# Patient Record
Sex: FEMALE | Race: WHITE
[De-identification: names, ages, dates, MRNs, and addresses within clinical notes are randomized per-mention and may not be internally consistent; named-entity substitution may affect disease eponyms.]

---

## 2023-10-23 ENCOUNTER — TELEPHONE (OUTPATIENT)
Facility: HOSPITAL | Age: 23
End: 2023-10-23

## 2023-11-03 ENCOUNTER — HOSPITAL ENCOUNTER (OUTPATIENT)
Facility: HOSPITAL | Age: 23
Setting detail: RECURRING SERIES
Discharge: HOME | End: 2023-11-06
Payer: COMMERCIAL

## 2023-11-03 PROCEDURE — 97162 PT EVAL MOD COMPLEX 30 MIN: CPT

## 2023-11-03 PROCEDURE — 97110 THERAPEUTIC EXERCISES: CPT

## 2023-11-03 NOTE — PROGRESS NOTES
[facility]  [address]  NO:[phone]  Plan of Care / Statement of Necessity for Physical Therapy Services     Patient Name: Vaughn Hart : 2000   Medical   Diagnosis: Pain in right knee [M25.561] Treatment Diagnosis: M25.561  RIGHT KNEE PAIN     Onset Date: 23( Date of surgery )      Referral Source: Hortensia Baca MD Start of Care [facility]): 11/3/2023   Prior Hospitalization: See medical history Provider #: [id number]   Prior Level of Function: Independent with ADLS and community activities with Less Pain    Comorbidities: Anxiety or Panic disorder , depression ,other disorders      Assessment / key information:  Vaughn Hart is a 21 y.o.  yo female with Dx: of R knee Pain S/P Right knee Lawson procedure , who reports R knee Pain . Pt rates pain as 6/10 max, 0/10 at best, 4/10 today, increasing with  ADLS, walking ,stair climbing , community activities . Stair climbing . Pain increases with R knee Ext only Objective: FOTO score = 38 (an established functional score where 100 = no disability). PT unable to assess ROM of R knee due to inappropriate clothing of patient . Pt also reports that she has R knee Brace  under her clothing . MMT:  R knee Flex  3-/5 , R knee ext 2+/5 , R hip Flex 3/5 . Pain and Tenderness  on R knee on Palpation over clothing . Pt instructed in HEP and will f/u in clinic for PT. Evaluation Complexity:  History:  MEDIUM  Complexity : 1-2 comorbidities / personal factors will impact the outcome/ POC ; Examination:  MEDIUM Complexity : 3 Standardized tests and measures addressin body structure, function, activity limitation and / or participation in recreation  ;Presentation:  MEDIUM Complexity : Evolving with changing characteristics  ; Clinical Decision Making:  MEDIUM Complexity : FOTO score of 26-74 FOTO score = an established functional score

## 2023-11-03 NOTE — PROGRESS NOTES
PHYSICAL / OCCUPATIONAL THERAPY - DAILY TREATMENT NOTE (updated )  For Eval visit    Patient Name: Babita Mention    Date: 11/3/2023    : 2000  Insurance: Payor: Vish Grant / Plan: Bob Luciano / Product Type: *No Product type* /      Patient  verified yes     Visit #   Current / Total 1 16   Time   In / Out 145 230   Pain   In / Out 4 4   Subjective Functional Status/Changes: See POC     TREATMENT AREA =  see POC    OBJECTIVE          22 min   Eval - untimed                      Therapeutic Procedures: Tx Min Billable or 1:1 Min (if diff from Tx Min) Procedure, Rationale, Specifics   23  42946 Therapeutic Exercise (timed):  increase ROM, strength, coordination, balance, and proprioception to improve patient's ability to progress to PLOF and address remaining functional goals.  (see flow sheet as applicable)     Details if applicable:              Details if applicable:            Details if applicable:            Details if applicable:            Details if applicable:     21  [facility] Totals Reminder: bill using total billable min of TIMED therapeutic procedures (example: do not include dry needle or estim unattended, both untimed codes, in totals to left)  8-22 min = 1 unit; 23-37 min = 2 units; 38-52 min = 3 units; 53-67 min = 4 units; 68-82 min = 5 units   Total Total     [x]  Patient Education billed concurrently with other procedures   [x] Review HEP    [] Progressed/Changed HEP, detail:    [] Other detail:       Objective Information/Functional Measures/Assessment    See POC    Patient will continue to benefit from skilled PT / OT services to modify and progress therapeutic interventions, analyze and address functional mobility deficits, analyze and address ROM deficits, analyze and address strength deficits, analyze and address soft tissue restrictions, analyze and cue for proper movement patterns, and analyze and modify for postural abnormalities to address functional deficits and

## 2023-11-10 ENCOUNTER — HOSPITAL ENCOUNTER (OUTPATIENT)
Facility: HOSPITAL | Age: 23
Setting detail: RECURRING SERIES
End: 2023-11-10
Payer: COMMERCIAL

## 2023-11-17 ENCOUNTER — HOSPITAL ENCOUNTER (OUTPATIENT)
Facility: HOSPITAL | Age: 23
Setting detail: RECURRING SERIES
Discharge: HOME | End: 2023-11-20
Payer: COMMERCIAL

## 2023-11-17 PROCEDURE — 97530 THERAPEUTIC ACTIVITIES: CPT

## 2023-11-17 PROCEDURE — 97110 THERAPEUTIC EXERCISES: CPT

## 2023-11-17 PROCEDURE — 97140 MANUAL THERAPY 1/> REGIONS: CPT

## 2023-11-17 NOTE — PROGRESS NOTES
PHYSICAL / OCCUPATIONAL THERAPY - DAILY TREATMENT NOTE (updated )    Patient Name: Rohini Martin    Date: 2023    : 2000  Insurance: Payor: Olamide Mann / Plan: [place] / Product Type: *No Product type* /      Patient  verified Yes     Visit #   Current / Total 2 16   Time   In / Out 10:12 11:08   Pain   In / Out 0 0   Subjective Functional Status/Changes: \"I have a lot pain when I kick my right leg out. \"     TREATMENT AREA =  Pain in right knee [M25.561]    OBJECTIVE    Modalities Rationale:     increase tissue extensibility to improve patient's ability to progress to PLOF and address remaining functional goals. min [] Estim Unattended, type/location:                                      []  w/ice    []  w/heat    min [] Estim Attended, type/location:                                     []  w/US     []  w/ice    []  w/heat    []  TENS insruct      min []  Mechanical Traction: type/lbs                   []  pro   []  sup   []  int   []  cont    []  before manual    []  after manual    min []  Ultrasound, settings/location:     10 min  unbill []  Ice     [x]  Heat    location/position: Supine   right knee    min []  Paraffin,  details:     min []  Vasopneumatic Device, press/temp:     min []  Aman Galo / Eliza Ropsanjay: If using vaso (only need to measure limb vaso being performed on)      pre-treatment girth :       post-treatment girth :       measured at (landmark location) :      min []  Other:    Skin assessment post-treatment:   Intact      Therapeutic Procedures: Tx Min Billable or 1:1 Min (if diff from Tx Min) Procedure, Rationale, Specifics   17  68080 Therapeutic Exercise (timed):  increase ROM, strength, coordination, balance, and proprioception to improve patient's ability to progress to PLOF and address remaining functional goals.  (see flow sheet as applicable)     Details if applicable:       11  12806 Therapeutic Activity (timed):  use of dynamic activities

## 2023-11-22 ENCOUNTER — HOSPITAL ENCOUNTER (OUTPATIENT)
Facility: HOSPITAL | Age: 23
Setting detail: RECURRING SERIES
Discharge: HOME | End: 2023-11-25
Payer: COMMERCIAL

## 2023-11-22 PROCEDURE — G0283 ELEC STIM OTHER THAN WOUND: HCPCS

## 2023-11-22 PROCEDURE — 97535 SELF CARE MNGMENT TRAINING: CPT

## 2023-11-22 PROCEDURE — 97530 THERAPEUTIC ACTIVITIES: CPT

## 2023-11-22 PROCEDURE — 97110 THERAPEUTIC EXERCISES: CPT

## 2023-11-22 NOTE — PROGRESS NOTES
PHYSICAL / OCCUPATIONAL THERAPY - DAILY TREATMENT NOTE (updated )    Patient Name: Radha Anthony    Date: 2023    : 2000  Insurance: Payor: Aleida Gilmoreor / Plan: Grisel Mahoney / Product Type: *No Product type* /      Patient  verified Yes     Visit #   Current / Total 3 16   Time   In / Out 850 1001   Pain   In / Out 1 1   Subjective Functional Status/Changes: It is still tight and I can't make a revolution on the bike. It still feels like it wants to give out on me every once in awhile. TREATMENT AREA =  Pain in right knee [M25.561]    OBJECTIVE    Modalities Rationale:     decrease pain, increase tissue extensibility, and increase muscle contraction/control to improve patient's ability to progress to PLOF and address remaining functional goals. 15 min [x] Estim Unattended, type/location:NMES right quad/VMO with quad sets   10/20                                      []  w/ice    []  w/heat    min [] Estim Attended, type/location:                                     []  w/US     []  w/ice    []  w/heat    []  TENS insruct      min []  Mechanical Traction: type/lbs                   []  pro   []  sup   []  int   []  cont    []  before manual    []  after manual    min []  Ultrasound, settings/location:     10 min  unbill []  Ice     [x]  Heat  post   location/position:right knee     min []  Paraffin,  details:     min []  Vasopneumatic Device, press/temp:     min []  Eula Salon / Delos Overcast: If using vaso (only need to measure limb vaso being performed on)      pre-treatment girth :       post-treatment girth :       measured at (landmark location) :      min []  Other:    Skin assessment post-treatment:   Redness, no adverse reactions      Therapeutic Procedures:     Tx Min Billable or 1:1 Min (if diff from Tx Min) Procedure, Rationale, Specifics   14 17 14824 Therapeutic Exercise (timed):  increase ROM, strength, coordination, balance, and proprioception to improve patient's ability to

## 2023-11-29 ENCOUNTER — TELEPHONE (OUTPATIENT)
Facility: HOSPITAL | Age: 23
End: 2023-11-29

## 2023-12-01 ENCOUNTER — HOSPITAL ENCOUNTER (OUTPATIENT)
Facility: HOSPITAL | Age: 23
Setting detail: RECURRING SERIES
Discharge: HOME | End: 2023-12-04
Payer: COMMERCIAL

## 2023-12-01 PROCEDURE — G0283 ELEC STIM OTHER THAN WOUND: HCPCS

## 2023-12-01 PROCEDURE — 97110 THERAPEUTIC EXERCISES: CPT

## 2023-12-01 PROCEDURE — 97530 THERAPEUTIC ACTIVITIES: CPT

## 2023-12-01 NOTE — PROGRESS NOTES
PHYSICAL / OCCUPATIONAL THERAPY - DAILY TREATMENT NOTE (updated )    Patient Name: Jewell Santamaria    Date: 2023    : 2000  Insurance: Payor: Bruce Hernandez / Plan: Barney Morocho / Product Type: *No Product type* /      Patient  verified Yes     Visit #   Current / Total 4 16   Time   In / Out 3:35 4:25   Pain   In / Out 0 0   Subjective Functional Status/Changes: PN completed today     TREATMENT AREA =  Pain in right knee [M25.561]    OBJECTIVE    Modalities Rationale:     decrease inflammation, decrease pain, and increase tissue extensibility to improve patient's ability to progress to PLOF and address remaining functional goals. 15 min [x] Estim Unattended, type/location:  [place] stem 15 (completed with FOTO)                                    []  w/ice    []  w/heat    min [] Estim Attended, type/location:                                     []  w/US     []  w/ice    []  w/heat    []  TENS insruct      min []  Mechanical Traction: type/lbs                   []  pro   []  sup   []  int   []  cont    []  before manual    []  after manual    min []  Ultrasound, settings/location:     10 min  unbill []  Ice     [x]  Heat    location/position: Recline at end of session    min []  Paraffin,  details:     min []  Vasopneumatic Device, press/temp:     min []  Giovannyvin Loots / Bolden Been: If using vaso (only need to measure limb vaso being performed on)      pre-treatment girth :       post-treatment girth :       measured at (landmark location) :      min []  Other:    Skin assessment post-treatment:   Intact      Therapeutic Procedures: Tx Min Billable or 1:1 Min (if diff from Tx Min) Procedure, Rationale, Specifics   10  12201 Therapeutic Activity (timed):  use of dynamic activities replicating functional movements to increase ROM, strength, coordination, balance, and proprioception in order to improve patient's ability to progress to PLOF and address remaining functional goals.   (see flow sheet

## 2023-12-01 NOTE — THERAPY EVALUATION
[address] [place] [address] PHYSICAL THERAPY  [address] [place] [address], [address] [place] [address]  AW:[phone] PE:[phone]  PHYSICAL THERAPY PROGRESS NOTE  Patient Name: Maria T Shabazz : 2000   Treatment/Medical Diagnosis: Pain in right knee [M25.561]   Referral Source: Gia Teran MD     Date of Initial Visit: 11/3/23 Attended Visits: 4 Missed Visits: 0     SUMMARY OF TREATMENT  Treatment has consisted of therapeutic exercise, therapeutic activities, neuro re-education, and use of e-stim to address R knee strength and ROM. Use of MHP at end of session. CURRENT STATUS  Pt reports improved strength and improve overall pain. Improved FOTO score from 35 to 45, demonstrating improved functional mobility. Pt achieved 97* of knee flexion this date. Pt continues to ambulate in brace locked at 120*. Pt unable to complete SLR or full ROM during LAQ. Pt reports pain with standing SLR . Pt provided updated HEP. Short Term Goals: To be accomplished in  3-4  weeks  1. Independent with HEP. EVAL: HEP established and given to patient   PN: reports compliance with HEP as able to perform  MET     2. Decrease pain to 2/10  to assist with  ADLs  and community activities   EVAL: 4/10    PN:1/10     12/1/23 MET    Long Term Goals: To be accomplished in  6-8  weeks:  1. Decrease  pain to 0/10  to assist with  ADLS  and community activities  EVAL:  4/10    PN:1/10     12/1/23 progressing    2. Improve FOTO Functional Status Score by 23  points in order to show significant functional improvement. EVAL: 38   PN: 45 progressing    3.   To improve   R knee Flex  3-/5 , R knee ext 2+/5 , R hip Flex 3/5  strength to 5/5 in order to assist with ADLS and community activities with decrease pain   EVAL:  R knee Flex  3-/5 , R knee ext 2+/5 , R hip Flex 3/5 strength   PN: R knee Flex  3-/5 , R knee ext -3/5, R hip Flex 4+/5 strength progressing       Functional Gains: Can extend her knee

## 2023-12-08 ENCOUNTER — HOSPITAL ENCOUNTER (OUTPATIENT)
Facility: HOSPITAL | Age: 23
Setting detail: RECURRING SERIES
Discharge: HOME | End: 2023-12-11
Payer: COMMERCIAL

## 2023-12-08 PROCEDURE — 97110 THERAPEUTIC EXERCISES: CPT

## 2023-12-08 PROCEDURE — G0283 ELEC STIM OTHER THAN WOUND: HCPCS

## 2023-12-08 PROCEDURE — 97112 NEUROMUSCULAR REEDUCATION: CPT

## 2023-12-08 PROCEDURE — 97530 THERAPEUTIC ACTIVITIES: CPT

## 2023-12-08 NOTE — PROGRESS NOTES
applicable)     Details if applicable:       12 12 13923 Therapeutic Activity (timed):  use of dynamic activities replicating functional movements to increase ROM, strength, coordination, balance, and proprioception in order to improve patient's ability to progress to PLOF and address remaining functional goals. (see flow sheet as applicable)     Details if applicable:     10 10 86069 Neuromuscular Re-Education (timed):  improve balance, coordination, kinesthetic sense, posture, core stability and proprioception to improve patient's ability to develop conscious control of individual muscles and awareness of position of extremities in order to progress to PLOF and address remaining functional goals. (see flow sheet as applicable)     Details if applicable:            Details if applicable:            Details if applicable:     36 41 [facility] Totals Reminder: bill using total billable min of TIMED therapeutic procedures (example: do not include dry needle or estim unattended, both untimed codes, in totals to left)  8-22 min = 1 unit; 23-37 min = 2 units; 38-52 min = 3 units; 53-67 min = 4 units; 68-82 min = 5 units   Total Total     [x]  Patient Education billed concurrently with other procedures   [x] Review HEP    [] Progressed/Changed HEP, detail:    [] Other detail:       Objective Information/Functional Measures/Assessment    VC exercises and technique  Now has home NMES and using 2x day 10 minutes  Heel slide F 120    E -5 supine mild Hamstring tightness and encouraged addition of gentle hamstring stretch to HEP    Able to demonstrate improved AROM LAQ with  mild to mod TKE lag due to weakness and some pain reports anterior infrapatellar region.          Patient will continue to benefit from skilled PT / OT services to modify and progress therapeutic interventions, analyze and address ROM deficits, analyze and address strength deficits, analyze and address soft tissue restrictions, analyze and cue for proper

## 2023-12-12 ENCOUNTER — HOSPITAL ENCOUNTER (OUTPATIENT)
Facility: HOSPITAL | Age: 23
Setting detail: RECURRING SERIES
Discharge: HOME | End: 2023-12-15
Payer: COMMERCIAL

## 2023-12-12 PROCEDURE — G0283 ELEC STIM OTHER THAN WOUND: HCPCS

## 2023-12-12 PROCEDURE — 97140 MANUAL THERAPY 1/> REGIONS: CPT

## 2023-12-12 PROCEDURE — 97110 THERAPEUTIC EXERCISES: CPT

## 2023-12-12 NOTE — PROGRESS NOTES
PHYSICAL / OCCUPATIONAL THERAPY - DAILY TREATMENT NOTE (updated )    Patient Name: Narda Krishna    Date: 2023    : 2000  Insurance: Payor: Stephanie Thorpe / Plan: [place] / Product Type: *No Product type* /      Patient  verified Yes     Visit #   Current / Total 2 8   Time   In / Out 1128 1235   Pain   In / Out 0 0   Subjective Functional Status/Changes: It's doing alright. Using the unit at home 2x day     TREATMENT AREA =  Pain in right knee [M25.561]    OBJECTIVE    Modalities Rationale:     decrease pain, increase tissue extensibility, increase muscle contraction/control, and post exercise  to improve patient's ability to progress to PLOF and address remaining functional goals. 15 min [x] Estim Unattended, type/location:Pakistani, right quad with Quad sets, 10/10                                       []  w/ice    []  w/heat    min [] Estim Attended, type/location:                                     []  w/US     []  w/ice    []  w/heat    []  TENS insruct      min []  Mechanical Traction: type/lbs                   []  pro   []  sup   []  int   []  cont    []  before manual    []  after manual    min []  Ultrasound, settings/location:     10 min  unbill []  Ice     [x]  Heat  post   location/position:supine right knee     min []  Paraffin,  details:     min []  Vasopneumatic Device, press/temp:     min []  Arleene Coats / Harlene : If using vaso (only need to measure limb vaso being performed on)      pre-treatment girth :       post-treatment girth :       measured at (landmark location) :      min []  Other:    Skin assessment post-treatment:   Intact      Therapeutic Procedures: Tx Min Billable or 1:1 Min (if diff from Tx Min) Procedure, Rationale, Specifics   12 8 73911 Therapeutic Exercise (timed):  increase ROM, strength, coordination, balance, and proprioception to improve patient's ability to progress to PLOF and address remaining functional goals.  (see flow sheet as

## 2023-12-22 ENCOUNTER — HOSPITAL ENCOUNTER (OUTPATIENT)
Facility: HOSPITAL | Age: 23
Setting detail: RECURRING SERIES
Discharge: HOME | End: 2023-12-25
Payer: COMMERCIAL

## 2023-12-22 PROCEDURE — 97110 THERAPEUTIC EXERCISES: CPT

## 2023-12-22 PROCEDURE — G0283 ELEC STIM OTHER THAN WOUND: HCPCS

## 2023-12-22 PROCEDURE — 97140 MANUAL THERAPY 1/> REGIONS: CPT

## 2023-12-22 PROCEDURE — 97530 THERAPEUTIC ACTIVITIES: CPT

## 2023-12-22 NOTE — PROGRESS NOTES
tissue restrictions, analyze and cue for proper movement patterns, analyze and modify for postural abnormalities, and instruct in home and community integration to address functional deficits and attain remaining goals. Progress toward goals / Updated goals:  []  See Progress Note/Recertification    New Goals to be achieved in __4__ weeks     1. Decrease  pain to 0/10  to assist with  ADLS and community activities  EVAL:  4/10    PN:1/10   12/1/23 progressing  CURRENT 0 12/22/23     2. Improve FOTO Functional Status Score by 23  points in order to show significant functional improvement. EVAL: 38   PN: 45 progressing  CURRENT NA 12/22/23     3. To improve   R knee Flex  3-/5 , R knee ext 2+/5 , R hip Flex 3/5  strength to 5/5 in order to assist with ADLS and community activities with decrease pain   EVAL:  R knee Flex  3-/5 , R knee ext 2+/5 , R hip Flex 3/5 strength   PN: R knee Flex  3-/5 , R knee ext -3/5, R hip Flex 4+/5 strength progressing   CURRENT NA 12/22/23    4. Pt to achieve 110* knee flexion ROM to improve tolerance with sitting in chairs. PN: 12/1/23  97* of knee flexion  CURRENT  122 F and -0 E 12/19/23        NA 12/22/23        Next PN/ RC due     PN 1/1/24  Auth due 30 v - 12/31/23         PLAN  - Continue Plan of Care  - Upgrade activities as tolerated  MD 30 day note likely will be due next session pending new appointments post today's session. Valeriano Wild, PT    12/22/2023    12:59 PM    No future appointments.

## 2024-01-02 ENCOUNTER — HOSPITAL ENCOUNTER (OUTPATIENT)
Facility: HOSPITAL | Age: 24
Setting detail: RECURRING SERIES
End: 2024-01-02
Payer: COMMERCIAL

## 2024-01-02 ENCOUNTER — TELEPHONE (OUTPATIENT)
Facility: HOSPITAL | Age: 24
End: 2024-01-02

## 2024-01-04 ENCOUNTER — HOSPITAL ENCOUNTER (OUTPATIENT)
Facility: HOSPITAL | Age: 24
Setting detail: RECURRING SERIES
Discharge: HOME | End: 2024-01-07
Payer: COMMERCIAL

## 2024-01-04 PROCEDURE — 97530 THERAPEUTIC ACTIVITIES: CPT

## 2024-01-04 PROCEDURE — G0283 ELEC STIM OTHER THAN WOUND: HCPCS

## 2024-01-04 PROCEDURE — 97110 THERAPEUTIC EXERCISES: CPT

## 2024-01-04 NOTE — PROGRESS NOTES
[facility]  [address], [address], [place] [address]  Ph:[phone] Fx:[phone]  PHYSICAL THERAPY PROGRESS NOTE  Patient Name: Sunshine Dumont : 2000   Treatment/Medical Diagnosis: Pain in right knee [M25.561]   Referral Source: Zac Aguirre MD     Date of Initial Visit: 11/3/23 Attended Visits: 10 Missed Visits: 0     SUMMARY OF TREATMENT  Patient has attended 10 visits since IE on 11/3/23. Treatment has consisted of: therapeutic exercise, therapeutic activities, neuromuscular re-education, and use of electrical stimulation and heat modalities.     CURRENT STATUS  Ms. Dumont presents for 10th visit including IE with report of 60% improvement since her right knee Lawson procedure on 23. She reports significant improvements in ambulation and standing tolerance, as well as improved ease with transferring in and out of her bathtub since SOC. While her stair negotiation has improved, she reports her greatest challenge remains with stair descent, reporting continued instability/lack of eccentric quad control. Decreased quad strength has also impeded her ability to drive her manual transmission vehicle and lift heavier household items to care for her pets. Ms. Dumont would continue to benefit from skilled PT intervention 2x/week for 4 weeks to continue to increase her quad strength and eccentric control for reduced instability and increased safety to return her to her PLOF.      Current Goal Status:   1.  Decrease  pain to 0/10  to assist with  ADLS and community activities  EVAL:  4/10    PN:10   23 progressing  CURRENT 0/10 prior to fall, 5-6/10 max post fall with end range bending     2.  Improve FOTO Functional Status Score by 23  points in order to show significant functional improvement.  EVAL: 38   PN: 45 progressing  CURRENT: progressing, 54 points      3.  To improve   R knee Flex  3-/5 , R knee ext 2+/5 , R hip 
insruct      min []  Mechanical Traction: type/lbs                   []  pro   []  sup   []  int   []  cont    []  before manual    []  after manual    min []  Ultrasound, settings/location:     PD min  unbill []  Ice     [x]  Heat   post  location/position:reclined, right knee     min []  Paraffin,  details:     min []  Vasopneumatic Device, press/temp:     min []  [place]irlpool / Fluido:    If using vaso (only need to measure limb vaso being performed on)      pre-treatment girth :       post-treatment girth :       measured at (landmark location) :      min []  Other:    Skin assessment post-treatment:   Intact      Therapeutic Procedures:    Tx Min Billable or 1:1 Min (if diff from Tx Min) Procedure, Rationale, Specifics   8  17219 Therapeutic Exercise (timed):  increase ROM, strength, coordination, balance, and proprioception to improve patient's ability to progress to PLOF and address remaining functional goals. (see flow sheet as applicable)     Details if applicable:       37  30187 Therapeutic Activity (timed):  use of dynamic activities replicating functional movements to increase ROM, strength, coordination, balance, and proprioception in order to improve patient's ability to progress to PLOF and address remaining functional goals.  (see flow sheet as applicable)     Details if applicable:  foto, functional assessments    x  91884 Manual Therapy (timed):  decrease pain, increase ROM, and increase tissue extensibility to improve patient's ability to progress to PLOF and address remaining functional goals.  The manual therapy interventions were performed at a separate and distinct time from the therapeutic activities interventions . (see flow sheet as applicable)     Details if applicable: reclined right knee scar massage, patellar mobs, STM effleurage to quad and distal hamstring tendons. ROM Knee F/E with gentle over stretch within tolerance           Details if applicable:            Details if applicable:

## 2024-01-09 ENCOUNTER — HOSPITAL ENCOUNTER (OUTPATIENT)
Facility: HOSPITAL | Age: 24
Setting detail: RECURRING SERIES
Discharge: HOME | End: 2024-01-12
Payer: COMMERCIAL

## 2024-01-09 PROCEDURE — 97112 NEUROMUSCULAR REEDUCATION: CPT

## 2024-01-09 PROCEDURE — G0283 ELEC STIM OTHER THAN WOUND: HCPCS

## 2024-01-09 PROCEDURE — 97110 THERAPEUTIC EXERCISES: CPT

## 2024-01-09 PROCEDURE — 97530 THERAPEUTIC ACTIVITIES: CPT

## 2024-01-09 NOTE — PROGRESS NOTES
Measures/Assessment    Patient unable to tolerate increased loading with terminal knee extension during 3 way hip exercise but exhibiting increased extension in stance with completion of reps on contralateral LE. Improved comfort noted post VC to increase hip hinge during functional squatting; patient able to tolerate progression to use of of foam for increased stabilization challenge immediately post form correction.     Patient will continue to benefit from skilled PT / OT services to modify and progress therapeutic interventions, analyze and address functional mobility deficits, analyze and address ROM deficits, analyze and address strength deficits, analyze and address soft tissue restrictions, analyze and cue for proper movement patterns, analyze and modify for postural abnormalities, analyze and address imbalance/dizziness, and instruct in home and community integration to address functional deficits and attain remaining goals.    Progress toward goals / Updated goals:  []  See Progress Note/Recertification    1.   Decrease  pain to 0/10  to assist with  ADLS and community activities  EVAL:  4/10    PN: progressing, 0/10 prior to fall, 5-6/10 max post fall with end range bending   Current: ongoing, presents to clinic with 0/10 pain but pain rises with right knee extension    2.  Improve FOTO Functional Status Score by 23 to 61 points in order to show significant functional improvement.  EVAL: 38   PN:  progressing, 54 points      3.  To improve   R knee Flex  3-/5 , R knee ext 2+/5 , R hip Flex 3/5  strength to 5/5 in order to assist with ADLS and community activities with decrease pain   PN: progressing, knee flex: 4/5, knee ext: 3/5, hip flex: 4+/5     4. Pt to achieve 127* knee flexion ROM to improve tolerance with sitting in chairs.   PN:  122 F and -0 E (prior to fall)       Next PN/ RC due 2/2/24  Auth due date pending    PLAN  - Continue Plan of Care    Viri Goncalves, PTA    1/9/2024    9:38

## 2024-01-12 ENCOUNTER — HOSPITAL ENCOUNTER (OUTPATIENT)
Facility: HOSPITAL | Age: 24
Setting detail: RECURRING SERIES
Discharge: HOME | End: 2024-01-15
Payer: COMMERCIAL

## 2024-01-12 PROCEDURE — 97530 THERAPEUTIC ACTIVITIES: CPT

## 2024-01-12 PROCEDURE — 97112 NEUROMUSCULAR REEDUCATION: CPT

## 2024-01-12 PROCEDURE — 97110 THERAPEUTIC EXERCISES: CPT

## 2024-01-12 PROCEDURE — G0283 ELEC STIM OTHER THAN WOUND: HCPCS

## 2024-01-12 NOTE — PROGRESS NOTES
PHYSICAL / OCCUPATIONAL THERAPY - DAILY TREATMENT NOTE    Patient Name: Sunshine Dumont    Date: 2024    : 2000  Insurance: Payor: BCBS / Plan: BCBS OUT OF STATE / Product Type: *No Product type* /      Patient  verified Yes     Visit #   Current / Total 3 8   Time   In / Out 1010 1104   Pain   In / Out 0 0   Subjective Functional Status/Changes: I did see the doctor, Keep the brace on due to decrease quad control . Today had to walk at [facility] 1 mile with no increase pain.      TREATMENT AREA =  Pain in right knee [M25.561]    OBJECTIVE   Modality rationale: increase muscle contraction/control to improve the patient’s ability to tolerate ADLs and activities   Min Type Additional Details   10 [x] Estim:  [x]Unatt       []IFC  []Premod                        [x]Other: Danish right Quad and VMO []w/ice   []w/heat  Position:  Location:    [] Estim: []Att    []TENS instruct  []NMES                    []Other:  []w/US   []w/ice   []w/heat  Position:  Location:    []  Traction: [] Cervical       []Lumbar                       [] Prone          []Supine                       []Intermittent   []Continuous Lbs:  [] before manual  [] after manual    []  Ultrasound: []Continuous   [] Pulsed                           []1MHz   []3MHz Location:  W/cm2:    []  Iontophoresis with dexamethasone         Location: [] Take home patch   [] In clinic    []  Ice     []  heat  []  Ice massage  []  Laser   []  Anodyne Position:  Location:    []  Laser with stim  []  Other: Position:  Location:    []  Vasopneumatic Device  Pre-treatment girth:  Post-treatment girth:  Measured at (location):  Pressure:       [] lo [] med [] hi   Temperature: [] lo [] med [] hi   [] Skin assessment post-treatment:  []intact []redness- no adverse reaction    []redness - adverse reaction:       Therapeutic Procedures:    Tx Min Billable or 1:1 Min (if diff from Tx Min) Procedure, Rationale, Specifics   15 11 [id number] Therapeutic Exercise

## 2024-01-16 ENCOUNTER — APPOINTMENT (OUTPATIENT)
Facility: HOSPITAL | Age: 24
End: 2024-01-16
Payer: COMMERCIAL

## 2024-01-18 ENCOUNTER — HOSPITAL ENCOUNTER (OUTPATIENT)
Facility: HOSPITAL | Age: 24
Setting detail: RECURRING SERIES
Discharge: HOME | End: 2024-01-21
Payer: COMMERCIAL

## 2024-01-18 PROCEDURE — G0283 ELEC STIM OTHER THAN WOUND: HCPCS

## 2024-01-18 PROCEDURE — 97112 NEUROMUSCULAR REEDUCATION: CPT

## 2024-01-18 PROCEDURE — 97110 THERAPEUTIC EXERCISES: CPT

## 2024-01-18 PROCEDURE — 97530 THERAPEUTIC ACTIVITIES: CPT

## 2024-01-18 NOTE — PROGRESS NOTES
PHYSICAL / OCCUPATIONAL THERAPY - DAILY TREATMENT NOTE    Patient Name: Sunshine Dumont    Date: 2024    : 2000  Insurance: Payor: BCBS / Plan: BCBS OUT OF STATE / Product Type: *No Product type* /      Patient  verified Yes     Visit #   Current / Total 4 8   Time   In / Out 1006 1110   Pain   In / Out 0 0   Subjective Functional Status/Changes: Patient reports no changes.      TREATMENT AREA =  Pain in right knee [M25.561]    OBJECTIVE   Modality rationale: increase muscle contraction/control to improve the patient’s ability to tolerate ADLs and activities   Min Type Additional Details   10 [x] Estim:  [x]Unatt       []IFC  []Premod                        [x]Other: [place] right Quad and VMO []w/ice   []w/heat  Position: long sitting  Location:right quad/VMO    [] Estim: []Att    []TENS instruct  []NMES                    []Other:  []w/US   []w/ice   []w/heat  Position:  Location:    []  Traction: [] Cervical       []Lumbar                       [] Prone          []Supine                       []Intermittent   []Continuous Lbs:  [] before manual  [] after manual    []  Ultrasound: []Continuous   [] Pulsed                           []1MHz   []3MHz Location:  W/cm2:    []  Iontophoresis with dexamethasone         Location: [] Take home patch   [] In clinic    []  Ice     []  heat  []  Ice massage  []  Laser   []  Anodyne Position:  Location:    []  Laser with stim  []  Other: Position:  Location:    []  Vasopneumatic Device  Pre-treatment girth:  Post-treatment girth:  Measured at (location):  Pressure:       [] lo [] med [] hi   Temperature: [] lo [] med [] hi   [x] Skin assessment post-treatment:  [x]intact [x]redness- no adverse reaction    []redness - adverse reaction:       Therapeutic Procedures:    Tx Min Billable or 1:1 Min (if diff from Tx Min) Procedure, Rationale, Specifics   15 15 16845 Therapeutic Exercise (timed):  increase ROM, strength, coordination, balance, and

## 2024-01-24 ENCOUNTER — HOSPITAL ENCOUNTER (OUTPATIENT)
Facility: HOSPITAL | Age: 24
Setting detail: RECURRING SERIES
Discharge: HOME | End: 2024-01-27
Payer: COMMERCIAL

## 2024-01-24 PROCEDURE — 97530 THERAPEUTIC ACTIVITIES: CPT

## 2024-01-24 PROCEDURE — 97110 THERAPEUTIC EXERCISES: CPT

## 2024-01-24 PROCEDURE — 97032 APPL MODALITY 1+ESTIM EA 15: CPT

## 2024-01-24 NOTE — PROGRESS NOTES
PHYSICAL / OCCUPATIONAL THERAPY - DAILY TREATMENT NOTE    Patient Name: Sunshine Dumont    Date: 2024    : 2000  Insurance: Payor: BCBS / Plan: BCBS OUT OF STATE / Product Type: *No Product type* /      Patient  verified Yes     Visit #   Current / Total 5 8   Time   In / Out 1253 pm  135 pm    Pain   In / Out 0/10  0/0   Subjective Functional Status/Changes: Patient denies any right knee pain today. Patient reports not wearing the brace today due it being too tight around her calf and causing increased pain.      TREATMENT AREA =  Pain in right knee [M25.561]    OBJECTIVE    Modalities Rationale:     increase muscle contraction/control to improve patient's ability to progress to PLOF and address remaining functional goals.     min [] Estim Unattended, type/location:                                      []  w/ice    []  w/heat   10 min [] Estim Attended, type/location:  NMES to right quad                                   []  w/US     []  w/ice    []  w/heat    []  TENS insruct      min []  Mechanical Traction: type/lbs                   []  pro   []  sup   []  int   []  cont    []  before manual    []  after manual    min []  Ultrasound, settings/location:      min  unbill []  Ice     []  Heat    location/position:     min []  Paraffin,  details:     min []  Vasopneumatic Device, press/temp:     min []  Whirlpool / Fluido:    If using vaso (only need to measure limb vaso being performed on)      pre-treatment girth :       post-treatment girth :       measured at (landmark location) :      min []  Other:    Skin assessment post-treatment:   Intact      Therapeutic Procedures:    Tx Min Billable or 1:1 Min (if diff from Tx Min) Procedure, Rationale, Specifics   18  [id number] Therapeutic Exercise (timed):  increase ROM, strength, coordination, balance, and proprioception to improve patient's ability to progress to PLOF and address remaining functional goals. (see flow sheet as applicable)  08-Feb-2022 11:42

## 2024-01-26 ENCOUNTER — APPOINTMENT (OUTPATIENT)
Facility: HOSPITAL | Age: 24
End: 2024-01-26
Payer: COMMERCIAL

## 2024-01-30 ENCOUNTER — HOSPITAL ENCOUNTER (OUTPATIENT)
Facility: HOSPITAL | Age: 24
Setting detail: RECURRING SERIES
Discharge: HOME | End: 2024-02-02
Payer: COMMERCIAL

## 2024-01-30 PROCEDURE — 97110 THERAPEUTIC EXERCISES: CPT

## 2024-01-30 PROCEDURE — 97530 THERAPEUTIC ACTIVITIES: CPT

## 2024-01-30 PROCEDURE — 97112 NEUROMUSCULAR REEDUCATION: CPT

## 2024-01-30 PROCEDURE — G0283 ELEC STIM OTHER THAN WOUND: HCPCS

## 2024-01-30 NOTE — PROGRESS NOTES
PHYSICAL / OCCUPATIONAL THERAPY - DAILY TREATMENT NOTE    Patient Name: Sunshine Dumont    Date: 2024    : 2000  Insurance: Payor: BCBS / Plan: BCBS OUT OF STATE / Product Type: *No Product type* /      Patient  verified Yes     Visit #   Current / Total 6 8   Time   In / Out 810 904   Pain   In / Out 0 0   Subjective Functional Status/Changes: I go back to the doctor      TREATMENT AREA =  Pain in right knee [M25.561]    OBJECTIVE    Modalities Rationale:     increase muscle contraction/control to improve patient's ability to progress to PLOF and address remaining functional goals.    10  min [x] Estim Unattended, type/location:[place] Zealander,  reclined, to right quad and VMO with quad set/TKE roll 10/10 cocontract                                      []  w/ice    []  w/heat    min [] Estim Attended, type/location:                                     []  w/US     []  w/ice    []  w/heat    []  TENS insruct      min []  Mechanical Traction: type/lbs                   []  pro   []  sup   []  int   []  cont    []  before manual    []  after manual    min []  Ultrasound, settings/location:      min  unbill []  Ice     []  Heat    location/position:     min []  Paraffin,  details:     min []  Vasopneumatic Device, press/temp:     min []  Whirlpool / Fluido:    If using vaso (only need to measure limb vaso being performed on)      pre-treatment girth :       post-treatment girth :       measured at (landmark location) :      min []  Other:    Skin assessment post-treatment:   Intact      Therapeutic Procedures:    Tx Min Billable or 1:1 Min (if diff from Tx Min) Procedure, Rationale, Specifics    57580 Therapeutic Exercise (timed):  increase ROM, strength, coordination, balance, and proprioception to improve patient's ability to progress to PLOF and address remaining functional goals. (see flow sheet as applicable)     Details if applicable:       10 10 13721 Neuromuscular Re-Education (timed):

## 2024-02-01 ENCOUNTER — HOSPITAL ENCOUNTER (OUTPATIENT)
Facility: HOSPITAL | Age: 24
Setting detail: RECURRING SERIES
Discharge: HOME | End: 2024-02-04
Payer: COMMERCIAL

## 2024-02-01 PROCEDURE — 97032 APPL MODALITY 1+ESTIM EA 15: CPT

## 2024-02-01 PROCEDURE — 97110 THERAPEUTIC EXERCISES: CPT

## 2024-02-01 PROCEDURE — 97530 THERAPEUTIC ACTIVITIES: CPT

## 2024-02-01 NOTE — PROGRESS NOTES
[facility]  [address], [address], [place] [address]  Ph:[phone] Fx:[phone]  PHYSICAL THERAPY PROGRESS NOTE  Patient Name: Sunshine Dumont : 2000   Treatment/Medical Diagnosis: Pain in right knee [M25.561]   Referral Source: Zac Aguirre MD     Date of Initial Visit: 11/3/2023 Attended Visits: 16 Missed Visits: 0       CURRENT STATUS    Patient has attended 16 total PT sessions and is progressing well towards PT goals. Patient reports improvements with right knee ROM, strength and overall pain. Patient continues to have right quad weakness with stair negotiation and when lifting objects but is able to tolerate better. Patient continues to lack right knee ROM by 5 degrees and would like to return back to running and jumping. Patient reports 70-75% improvement with PT and will benefit from additional PT to address remaining deficits. Patient will continue to benefit from skilled PT / OT services to modify and progress therapeutic interventions, analyze and address functional mobility deficits, analyze and address ROM deficits, analyze and address strength deficits, analyze and address soft tissue restrictions, analyze and cue for proper movement patterns, analyze and modify for postural abnormalities, and instruct in home and community integration to address functional deficits and attain remaining goals.     Functional Gains: Right knee strength, mobility, pain, stair negotiation, lifting weight objects, walking  Functional Deficits: Full right knee ROM, running, jumping, weakness with stair negotiation and other activities   % improvement: 70-75% improvement   Pain   Average: 0/10                  Best: 0/10                Worst: 2/10  Patient Goal: \"To regain mobility, strength and return to normal activities.\"     1.   Decrease  pain to 0/10  to assist with  ADLS and community activities  EVAL:  10    PN: progressing, 0/10 
deficits, analyze and address strength deficits, analyze and address soft tissue restrictions, analyze and cue for proper movement patterns, analyze and modify for postural abnormalities, and instruct in home and community integration to address functional deficits and attain remaining goals.    Progress toward goals / Updated goals:  []  See Progress Note/Recertification      1.   Decrease  pain to 0/10  to assist with  ADLS and community activities  EVAL:  4/10    PN: progressing, 0/10 prior to fall, 5-6/10 max post fall with end range bending   Current: Patient reports no pain today. MET      2.  Improve FOTO Functional Status Score by 23 to 61 points in order to show significant functional improvement.  EVAL: 38   PN:  progressing, 54 points   CURRENT : 65 points. MET      3.  To improve   R knee Flex  3-/5 , R knee ext 2+/5 , R hip Flex 3/5  strength to 5/5 in order to assist with ADLS and community activities with decrease pain   PN: progressing, knee flex: 4/5, knee ext: 3/5, hip flex: 4+/5   CURRENT : knee flex: 4/5, knee ext: 4-/5 with pain, hip flex: 4+/5. Progressing     4. Pt to achieve 127* knee flexion ROM to improve tolerance with sitting in chairs.   PN:  122 F and -0 E (prior to fall)   CURRENT : Right knee flexion AROM- 130 degrees. MET     Next PN/ RC due 24  Auth due (visit number/ date) 30 visits  24     PLAN  - Continue Plan of Care  - Upgrade activities as tolerated    Stefano Barnett PTA    2024    11:50 AM    Future Appointments   Date Time Provider Department Center   2024  8:50 AM Annemarie Mckay, PT [facility]   2024  1:30 PM Stefano Barnett PTA [facility]   2024 11:30 AM Viri Goncalves PTA [facility]   2/15/2024 11:30 AM Stefano Barnett PTA [facility]   2024 11:30 AM Viri Goncalves PTA [facility]   2024 11:30 AM Viri Goncalves PTA [facility]   2024 11:30 AM Stefano Barnett PTA [facility]   2024 11:30 AM Ivanna

## 2024-02-06 ENCOUNTER — HOSPITAL ENCOUNTER (OUTPATIENT)
Facility: HOSPITAL | Age: 24
Setting detail: RECURRING SERIES
End: 2024-02-06
Payer: COMMERCIAL

## 2024-02-09 ENCOUNTER — HOSPITAL ENCOUNTER (OUTPATIENT)
Facility: HOSPITAL | Age: 24
Setting detail: RECURRING SERIES
End: 2024-02-09
Payer: COMMERCIAL

## 2024-02-13 ENCOUNTER — HOSPITAL ENCOUNTER (OUTPATIENT)
Facility: HOSPITAL | Age: 24
Setting detail: RECURRING SERIES
Discharge: HOME | End: 2024-02-16
Payer: COMMERCIAL

## 2024-02-13 PROCEDURE — 97032 APPL MODALITY 1+ESTIM EA 15: CPT

## 2024-02-13 PROCEDURE — 97530 THERAPEUTIC ACTIVITIES: CPT

## 2024-02-13 PROCEDURE — 97110 THERAPEUTIC EXERCISES: CPT

## 2024-02-13 PROCEDURE — 97112 NEUROMUSCULAR REEDUCATION: CPT

## 2024-02-13 NOTE — PROGRESS NOTES
PHYSICAL / OCCUPATIONAL THERAPY - DAILY TREATMENT NOTE    Patient Name: Sunshine Dumont    Date: 2024    : 2000  Insurance: Payor: BCBS / Plan: BCBS OUT OF STATE / Product Type: *No Product type* /      Patient  verified Yes     Visit #   Current / Total 1 8   Time   In / Out 1130 1223   Pain   In / Out 0 0   Subjective Functional Status/Changes: Patient reports that she has abolished brace wear bc it is no longer comfortable. \"I've been doing very well without it\".      TREATMENT AREA =  Pain in right knee [M25.561]    OBJECTIVE    Modalities Rationale:     increase muscle contraction/control to improve patient's ability to progress to PLOF and address remaining functional goals.     min [] Estim Unattended, type/location:                                      []  w/ice    []  w/heat   10 min [x] Estim Attended, type/location:  NMES to right quad                                   []  w/US     []  w/ice    []  w/heat    []  TENS insruct      min []  Mechanical Traction: type/lbs                   []  pro   []  sup   []  int   []  cont    []  before manual    []  after manual    min []  Ultrasound, settings/location:      min  unbill []  Ice     []  Heat    location/position:     min []  Paraffin,  details:     min []  Vasopneumatic Device, press/temp:     min []  Whirlpool / Fluido:    If using vaso (only need to measure limb vaso being performed on)      pre-treatment girth :       post-treatment girth :       measured at (landmark location) :      min []  Other:    Skin assessment post-treatment:   Intact      Therapeutic Procedures:    Tx Min Billable or 1:1 Min (if diff from Tx Min) Procedure, Rationale, Specifics   15 15 [id number] Therapeutic Activity (timed):  use of dynamic activities replicating functional movements to increase ROM, strength, coordination, balance, and proprioception in order to improve patient's ability to progress to PLOF and address remaining functional goals.  (see

## 2024-02-15 ENCOUNTER — HOSPITAL ENCOUNTER (OUTPATIENT)
Facility: HOSPITAL | Age: 24
Setting detail: RECURRING SERIES
Discharge: HOME | End: 2024-02-18
Payer: COMMERCIAL

## 2024-02-15 PROCEDURE — 97112 NEUROMUSCULAR REEDUCATION: CPT

## 2024-02-15 PROCEDURE — 97110 THERAPEUTIC EXERCISES: CPT

## 2024-02-15 PROCEDURE — 97530 THERAPEUTIC ACTIVITIES: CPT

## 2024-02-15 NOTE — PROGRESS NOTES
PHYSICAL / OCCUPATIONAL THERAPY - DAILY TREATMENT NOTE    Patient Name: Sunshine Dumont    Date: 2/15/2024    : 2000  Insurance: Payor: BCBS / Plan: BCBS OUT OF STATE / Product Type: *No Product type* /      Patient  verified Yes     Visit #   Current / Total 2 8   Time   In / Out 1130 am  1210 pm    Pain   In / Out 0/10 0/10    Subjective Functional Status/Changes: Patient denies any pain today.      TREATMENT AREA =  Pain in right knee [M25.561]    OBJECTIVE         Therapeutic Procedures:    Tx Min Billable or 1:1 Min (if diff from Tx Min) Procedure, Rationale, Specifics   15  61883 Therapeutic Exercise (timed):  increase ROM, strength, coordination, balance, and proprioception to improve patient's ability to progress to PLOF and address remaining functional goals. (see flow sheet as applicable)     Details if applicable:       15  98097 Therapeutic Activity (timed):  use of dynamic activities replicating functional movements to increase ROM, strength, coordination, balance, and proprioception in order to improve patient's ability to progress to PLOF and address remaining functional goals.  (see flow sheet as applicable)     Details if applicable:     10  50787 Neuromuscular Re-Education (timed):  improve balance, coordination, kinesthetic sense, posture, core stability and proprioception to improve patient's ability to develop conscious control of individual muscles and awareness of position of extremities in order to progress to PLOF and address remaining functional goals. (see flow sheet as applicable)     Details if applicable:            Details if applicable:            Details if applicable:     40  [facility] Totals Reminder: bill using total billable min of TIMED therapeutic procedures (example: do not include dry needle or estim unattended, both untimed codes, in totals to left)  8-22 min = 1 unit; 23-37 min = 2 units; 38-52 min = 3 units; 53-67 min = 4 units; 68-82 min = 5 units   Total

## 2024-02-20 ENCOUNTER — HOSPITAL ENCOUNTER (OUTPATIENT)
Facility: HOSPITAL | Age: 24
Setting detail: RECURRING SERIES
Discharge: HOME | End: 2024-02-23
Payer: COMMERCIAL

## 2024-02-20 PROCEDURE — 97112 NEUROMUSCULAR REEDUCATION: CPT

## 2024-02-20 PROCEDURE — 97032 APPL MODALITY 1+ESTIM EA 15: CPT

## 2024-02-20 PROCEDURE — 97110 THERAPEUTIC EXERCISES: CPT

## 2024-02-20 PROCEDURE — 97530 THERAPEUTIC ACTIVITIES: CPT

## 2024-02-20 NOTE — PROGRESS NOTES
PHYSICAL / OCCUPATIONAL THERAPY - DAILY TREATMENT NOTE    Patient Name: Sunshine Dumont    Date: 2024    : 2000  Insurance: Payor: BCBS / Plan: BCBS OUT OF STATE / Product Type: *No Product type* /      Patient  verified Yes     Visit #   Current / Total 3 8   Time   In / Out 1126  1223    Pain   In / Out 0 0   Subjective Functional Status/Changes: \"I have another doctor's appointment tomorrow\".      TREATMENT AREA =  Pain in right knee [M25.561]    OBJECTIVE       Modality rationale: increase muscle contraction/control to improve the patient’s ability to normalize gait pattern.    Min Type Additional Details    [] Estim:  []Unatt       []IFC  []Premod                        []Other:  []w/ice   []w/heat  Position:  Location:   10 [x] Estim: []Att    []TENS instruct  [x]NMES                    []Other:  []w/US   []w/ice   []w/heat  Position: supine with bolster  Location: right quad    []  Traction: [] Cervical       []Lumbar                       [] Prone          []Supine                       []Intermittent   []Continuous Lbs:  [] before manual  [] after manual    []  Ultrasound: []Continuous   [] Pulsed                           []1MHz   []3MHz Location:  W/cm2:    []  Iontophoresis with dexamethasone         Location: [] Take home patch   [] In clinic    []  Ice     []  heat  []  Ice massage  []  Laser   []  Anodyne Position:  Location:    []  Laser with stim  []  Other: Position:  Location:    []  Vasopneumatic Device  Pre-treatment girth:  Post-treatment girth:  Measured at (location):  Pressure:       [] lo [] med [] hi   Temperature: [] lo [] med [] hi   [x] Skin assessment post-treatment:  [x]intact [x]redness- no adverse reaction    []redness - adverse reaction:      Therapeutic Procedures:    Tx Min Billable or 1:1 Min (if diff from Tx Min) Procedure, Rationale, Specifics   9 9 90811 Therapeutic Exercise (timed):  increase ROM, strength, coordination, balance, and proprioception to

## 2024-02-22 ENCOUNTER — HOSPITAL ENCOUNTER (OUTPATIENT)
Facility: HOSPITAL | Age: 24
Setting detail: RECURRING SERIES
Discharge: HOME | End: 2024-02-25
Payer: COMMERCIAL

## 2024-02-22 PROCEDURE — 97530 THERAPEUTIC ACTIVITIES: CPT

## 2024-02-22 PROCEDURE — 97112 NEUROMUSCULAR REEDUCATION: CPT

## 2024-02-22 PROCEDURE — 97110 THERAPEUTIC EXERCISES: CPT

## 2024-02-22 NOTE — PROGRESS NOTES
PHYSICAL / OCCUPATIONAL THERAPY - DAILY TREATMENT NOTE    Patient Name: Sunshine Dumont    Date: 2024    : 2000  Insurance: Payor: BCBS / Plan: BCBS OUT OF STATE / Product Type: *No Product type* /      Patient  verified Yes     Visit #   Current / Total 4 8   Time   In / Out 1130 1216   Pain   In / Out 0 0   Subjective Functional Status/Changes: \"My doctor says that I am healing well\". Patient arrives with jeans donned.      TREATMENT AREA =  Pain in right knee [M25.561]    OBJECTIVE       Modality rationale: increase muscle contraction/control to improve the patient’s ability to normalize gait pattern.    Min Type Additional Details    [] Estim:  []Unatt       []IFC  []Premod                        []Other:  []w/ice   []w/heat  Position:  Location:   x [x] Estim: []Att    []TENS instruct  [x]NMES                    []Other:  []w/US   []w/ice   []w/heat  Position: supine with bolster  Location: right quad    []  Traction: [] Cervical       []Lumbar                       [] Prone          []Supine                       []Intermittent   []Continuous Lbs:  [] before manual  [] after manual    []  Ultrasound: []Continuous   [] Pulsed                           []1MHz   []3MHz Location:  W/cm2:    []  Iontophoresis with dexamethasone         Location: [] Take home patch   [] In clinic    []  Ice     []  heat  []  Ice massage  []  Laser   []  Anodyne Position:  Location:    []  Laser with stim  []  Other: Position:  Location:    []  Vasopneumatic Device  Pre-treatment girth:  Post-treatment girth:  Measured at (location):  Pressure:       [] lo [] med [] hi   Temperature: [] lo [] med [] hi   [] Skin assessment post-treatment:  []intact []redness- no adverse reaction    []redness - adverse reaction:      Therapeutic Procedures:    Tx Min Billable or 1:1 Min (if diff from Tx Min) Procedure, Rationale, Specifics   10 10 65933 Therapeutic Exercise (timed):  increase ROM, strength, coordination,

## 2024-02-27 ENCOUNTER — HOSPITAL ENCOUNTER (OUTPATIENT)
Facility: HOSPITAL | Age: 24
Setting detail: RECURRING SERIES
Discharge: HOME | End: 2024-03-01
Payer: COMMERCIAL

## 2024-02-27 PROCEDURE — 97110 THERAPEUTIC EXERCISES: CPT

## 2024-02-27 PROCEDURE — 97112 NEUROMUSCULAR REEDUCATION: CPT

## 2024-02-27 PROCEDURE — 97530 THERAPEUTIC ACTIVITIES: CPT

## 2024-02-27 NOTE — PROGRESS NOTES
both untimed codes, in totals to left)  8-22 min = 1 unit; 23-37 min = 2 units; 38-52 min = 3 units; 53-67 min = 4 units; 68-82 min = 5 units   Total Total     [x]  Patient Education billed concurrently with other procedures   [x] Review HEP    [] Progressed/Changed HEP, detail:    [] Other detail:       Objective Information/Functional Measures/Assessment    Patient presents to today's session with no reports of pain. Today's session focused on improving right quad strength for functional tasks. Patient tolerates quad focused exercises well without compensation. No increase in pain was reported during today's session. Will continue to progress patient as tolerated and able.     Patient will continue to benefit from skilled PT / OT services to modify and progress therapeutic interventions, analyze and address functional mobility deficits, analyze and address ROM deficits, analyze and address strength deficits, analyze and address soft tissue restrictions, analyze and cue for proper movement patterns, analyze and address imbalance/dizziness, and instruct in home and community integration to address functional deficits and attain remaining goals.    Progress toward goals / Updated goals:  []  See Progress Note/Recertification    1. To improve R knee Flex  3-/5 , R knee ext 2+/5 , R hip Flex 3/5  strength to 5/5 in order to assist with ADLS and community activities with decrease pain   PN: knee flex: 4/5, knee ext: 4-/5 with pain, hip flex: 4+/5.   Current: Ongoing, will assess at next PN. 2/27/24     2. Patient will tolerate 8'' stairs x 3 with reciprocal pattern and no signs of right quad weakness in order to improve tolerance for functional tasks at home and in the community.   PN: Patient able to tolerate 8\" stairs but reports right quad weakness.   Current: MET, Patient able to negotiate 8\" stairs x 5 reps in reciprocal gait pattern with no reports of pain. 2/27/24     3. Patient will perform 20# box lift with

## 2024-02-29 ENCOUNTER — HOSPITAL ENCOUNTER (OUTPATIENT)
Facility: HOSPITAL | Age: 24
Setting detail: RECURRING SERIES
End: 2024-02-29
Payer: COMMERCIAL

## 2024-02-29 PROCEDURE — 97110 THERAPEUTIC EXERCISES: CPT

## 2024-02-29 PROCEDURE — 97530 THERAPEUTIC ACTIVITIES: CPT

## 2024-02-29 NOTE — PROGRESS NOTES
PHYSICAL / OCCUPATIONAL THERAPY - DAILY TREATMENT NOTE    Patient Name: Sunshine Dumont    Date: 2024    : 2000  Insurance: Payor: BC / Plan: BCBS OUT OF STATE / Product Type: *No Product type* /      Patient  verified Yes     Visit #   Current / Total 6 8   Time   In / Out 1128 1207   Pain   In / Out 0 0   Subjective Functional Status/Changes: Functional Gains: walking without pain and without a brace, improved ease of lifting boxes at work and at home, mobility  Functional Deficits: deep squatting and lifting, transferring from floor to standing  % improvement: 90%  Pain   Average: 0/10       Best: 0/10     Worst: 0/10  Patient Goal: \"to reduce my frequency of visits over the next month and return to the gym but focus on improving transferring from floor to standing and deep squatting\"       TREATMENT AREA =  Pain in right knee [M25.561]    OBJECTIVE         Therapeutic Procedures:    Tx Min Billable or 1:1 Min (if diff from Tx Min) Procedure, Rationale, Specifics   9 9 10996 Therapeutic Exercise (timed):  increase ROM, strength, coordination, balance, and proprioception to improve patient's ability to progress to PLOF and address remaining functional goals. (see flow sheet as applicable)     Details if applicable:       30 30 73774 Therapeutic Activity (timed):  use of dynamic activities replicating functional movements to increase ROM, strength, coordination, balance, and proprioception in order to improve patient's ability to progress to PLOF and address remaining functional goals.  (see flow sheet as applicable)     Details if applicable:  functional assessments   x x 97767 Neuromuscular Re-Education (timed):  improve balance, coordination, kinesthetic sense, posture, core stability and proprioception to improve patient's ability to develop conscious control of individual muscles and awareness of position of extremities in order to progress to PLOF and address remaining functional

## 2024-02-29 NOTE — PROGRESS NOTES
[facility]  [address], [address], [place] [address]  Ph:[phone] Fx:[phone]  PHYSICAL THERAPY PROGRESS NOTE  Patient Name: Sunshine Dumont : 2000   Treatment/Medical Diagnosis: Pain in right knee [M25.561]   Referral Source: Zac Agiurre MD     Date of Initial Visit: 11/3/23 Attended Visits: 22 Missed Visits: 1     SUMMARY OF TREATMENT  Ms. Dumont presents for her nd visit including IE with report of 90% improvement since SOC. She reports decreased pain during ambulation as well as no longer relying on use of her knee brace. Her mobility and functional lifting has improved; patient can now lift a 22# box multiple times from a power position. Functional deficits remain, most significantly with regard to deep squatting. Ms. Dumont also reports moderate difficulty during floor to standing transfers with loading, making work duties more difficult. She would continue to benefit from skilled PT 1x/week for 4 weeks to improve her ease of functional lifting and transferring under load for a return to [facility].      CURRENT STATUS   To improve R knee Flex  3-/5 , R knee ext 2+/5 , R hip Flex 3/5  strength to 5/5 in order to assist with ADLS and community activities with decrease pain   PN: knee flex: 4/5, knee ext: 4-/5 with pain, hip flex: 4+/5.   Current: nearly met, knee flex: 4+/5, knee ext: 5/5, hip flex: 5/5     2. Patient will tolerate 8'' stairs x 3 with reciprocal pattern and no signs of right quad weakness in order to improve tolerance for functional tasks at home and in the community.   PN: Patient able to tolerate 8\" stairs but reports right quad weakness.   Current: MET, Patient able to negotiate 8\" stairs x 5 reps in reciprocal gait pattern with no reports of pain. 24     3. Patient will perform 20# box lift with proper form and no increased reports of right knee pain in order to perform functional tasks with ease.   PN:  Patient reports increased right knee pain when lifting.   Current: MET,  completes 10 reps from floor with 22# without report of knee pain. 2/27/24      Functional Gains: walking without pain and without a brace, improved ease of lifting boxes at work and at home, mobility  Functional Deficits: deep squatting and lifting, transferring from floor to standing  % improvement: 90%  Pain   Average: 0/10                  Best: 0/10                Worst: 0/10  Patient Goal: \"to reduce my frequency of visits over the next month and return to the gym but focus on improving transferring from floor to standing and deep squatting\"    Payor: BCBS / Plan: BCBS OUT OF STATE / Product Type: *No Product type* /     Non-Medicare, can change goals, can adjust or add frequency duration, no signature required      New Goals to be achieved in __4__ WEEKS  1. Patient will be able to complete 30\" wall sitting at 90 degrees for improved ability to deep squat during functional activities.   PN: 14\" at 90 degrees      2. Patient will report minimal to no difficulty completing 25# KB half kneeling transfer from floor to standing=  PN: Reports moderate difficulty during completion of 25# KB half kneeling lifting from floor to standing     Frequency / Duration:   Patient to be seen   1   times per week for   4    WEEKS    RECOMMENDATIONS  We would like to continue therapy for progress to goals stated above.  Continue per initial Plan of Care.    If you have any questions/comments please contact us directly.  Thank you for allowing us to assist in the care of your patient.    Viri Goncalves, PTA       2/29/2024       2:28 PM

## 2024-03-05 ENCOUNTER — HOSPITAL ENCOUNTER (OUTPATIENT)
Facility: HOSPITAL | Age: 24
Setting detail: RECURRING SERIES
Discharge: HOME | End: 2024-03-08
Payer: COMMERCIAL

## 2024-03-05 PROCEDURE — 97530 THERAPEUTIC ACTIVITIES: CPT

## 2024-03-05 PROCEDURE — 97110 THERAPEUTIC EXERCISES: CPT

## 2024-03-05 PROCEDURE — 97112 NEUROMUSCULAR REEDUCATION: CPT

## 2024-03-06 NOTE — PROGRESS NOTES
Patient Name: Sunshine Dumont    : 2000    Seen for visit 1 of 4   On 3/5/24 (date)    Downtime documentation for this visit performed, due to unavailable EMR, and scanned into chart media.    Alexia De Leon PTA

## 2024-03-12 ENCOUNTER — HOSPITAL ENCOUNTER (OUTPATIENT)
Facility: HOSPITAL | Age: 24
Setting detail: RECURRING SERIES
Discharge: HOME | End: 2024-03-15
Payer: COMMERCIAL

## 2024-03-12 PROCEDURE — 97112 NEUROMUSCULAR REEDUCATION: CPT

## 2024-03-12 PROCEDURE — 97530 THERAPEUTIC ACTIVITIES: CPT

## 2024-03-12 NOTE — PROGRESS NOTES
[x]  Patient Education billed concurrently with other procedures   [x] Review HEP    [] Progressed/Changed HEP, detail:    [] Other detail:       Objective Information/Functional Measures/Assessment  Pt required cues on performing static lunges correctly.  Overall pt completed remaining strengthening and functional task well with no pain or difficulty. Pt is ambulating with good step length.      Patient will continue to benefit from skilled PT / OT services to modify and progress therapeutic interventions, analyze and address functional mobility deficits, analyze and address ROM deficits, analyze and address strength deficits, analyze and address soft tissue restrictions, analyze and cue for proper movement patterns, analyze and modify for postural abnormalities, analyze and address imbalance/dizziness, and instruct in home and community integration to address functional deficits and attain remaining goals.    Progress toward goals / Updated goals:  []  See Progress Note/Recertification  1. Patient will be able to complete 30\" wall sitting at 90 degrees for improved ability to deep squat during functional activities.   PN: 14\" at 90 degrees   Current:30\"    at 90 deg  3/12/24      2. Patient will report minimal to no difficulty completing 25# KB half kneeling transfer from floor to standing=  PN: Reports moderate difficulty during completion of 25# KB half kneeling lifting from floor to standing   Current:  pt was able to perform with no difficulty  3/12/24      Next PN/ RC due 3/29/24  Auth due (visit number/ date) WON  12/31/24    PLAN  - Continue Plan of Care    Alexia De Leon PTA    3/12/2024    9:07 AM    Future Appointments   Date Time Provider Department Center   3/19/2024  8:50 AM Stefano Barnett PTA [facility]   3/26/2024  8:50 AM [facility]

## 2024-03-19 ENCOUNTER — HOSPITAL ENCOUNTER (OUTPATIENT)
Facility: HOSPITAL | Age: 24
Setting detail: RECURRING SERIES
Discharge: HOME | End: 2024-03-22
Payer: COMMERCIAL

## 2024-03-19 PROCEDURE — 97530 THERAPEUTIC ACTIVITIES: CPT

## 2024-03-19 PROCEDURE — 97110 THERAPEUTIC EXERCISES: CPT

## 2024-03-19 NOTE — PROGRESS NOTES
PHYSICAL / OCCUPATIONAL THERAPY - DAILY TREATMENT NOTE    Patient Name: Sunshine Dumont    Date: 3/19/2024    : 2000  Insurance: Payor: BCBS / Plan: BCBS OUT OF STATE / Product Type: *No Product type* /      Patient  verified Yes     Visit #   Current / Total 3 4   Time   In / Out 845 am  927 am    Pain   In / Out 0/10  0/10    Subjective Functional Status/Changes: Patient states that her knee feels 85-90% better.      TREATMENT AREA =  Pain in right knee [M25.561]    OBJECTIVE         Therapeutic Procedures:    Tx Min Billable or 1:1 Min (if diff from Tx Min) Procedure, Rationale, Specifics   17  48865 Therapeutic Exercise (timed):  increase ROM, strength, coordination, balance, and proprioception to improve patient's ability to progress to PLOF and address remaining functional goals. (see flow sheet as applicable)     Details if applicable:       25  22095 Therapeutic Activity (timed):  use of dynamic activities replicating functional movements to increase ROM, strength, coordination, balance, and proprioception in order to improve patient's ability to progress to PLOF and address remaining functional goals.  (see flow sheet as applicable)     Details if applicable:            Details if applicable:            Details if applicable:            Details if applicable:     42  [facility] Totals Reminder: bill using total billable min of TIMED therapeutic procedures (example: do not include dry needle or estim unattended, both untimed codes, in totals to left)  8-22 min = 1 unit; 23-37 min = 2 units; 38-52 min = 3 units; 53-67 min = 4 units; 68-82 min = 5 units   Total Total     [x]  Patient Education billed concurrently with other procedures   [x] Review HEP    [] Progressed/Changed HEP, detail:    [] Other detail:       Objective Information/Functional Measures/Assessment    Patient continues to tolerate PT well. Added lateral step downs to further assist with increasing right functional knee strength.

## 2024-03-26 ENCOUNTER — HOSPITAL ENCOUNTER (OUTPATIENT)
Facility: HOSPITAL | Age: 24
Setting detail: RECURRING SERIES
Discharge: HOME | End: 2024-03-29
Payer: COMMERCIAL

## 2024-03-26 PROCEDURE — 97110 THERAPEUTIC EXERCISES: CPT

## 2024-03-26 PROCEDURE — 97530 THERAPEUTIC ACTIVITIES: CPT

## 2024-03-26 NOTE — PROGRESS NOTES
Physical Therapy Discharge Instructions      [facility] - [address], [address]  [place] [address] [phone] fax    Patient: Sunshine Dumont  : 2000      Continue Home Exercise Program 2 times per day for 4 weeks, then decrease to 2 times per week      Continue with    [x] Ice  as needed 2 times per day     [] Heat           Follow up with MD:     [x] Upon completion of therapy     [] As needed      Recommendations:     []   Return to activity with home program    []   Return to activity with the following modifications:       []Post Rehab Program    []Join Independent aquatic program     []Return to/join local gym      Additional Comments: thanks for all your hard work      Alexia De Leon PTA 3/26/2024 9:21 AM

## 2024-03-26 NOTE — PROGRESS NOTES
PT DISCHARGE DAILY NOTE AND SUMMARY     Date:3/26/2024  Patient name: Sunshine Dumont Start of Care: 11/3/2023    Referral source: Zac Aguirre MD : 2000   Medical/Treatment Diagnosis: Pain in right knee [M25.561] Onset Date:23( Date of surgery )      Prior Hospitalization: see medical history Provider#: [id number]   Comorbidities: Anxiety or Panic disorder , depression ,other disorders    Prior Level of Function:  Insurance: Payor: BCBS / Plan: BCBS OUT OF STATE / Product Type: *No Product type* /      Visits from Start of Care: 26 Missed Visits: 2    non-Medicare    Patient  verified yes     Visit #   Current / Total 4 4   Time   In / Out 8:47 9:23   Pain   In / Out 0 0   Subjective Functional Status/Changes: \"Im doing good.\"     TREATMENT AREA =  Pain in right knee [M25.561]    OBJECTIVE         Therapeutic Procedures:    Tx Min Billable or 1:1 Min (if diff from Tx Min) Procedure, Rationale, Specifics   16  81933 Therapeutic Exercise (timed):  increase ROM, strength, coordination, balance, and proprioception to improve patient's ability to progress to PLOF and address remaining functional goals. (see flow sheet as applicable)     Details if applicable:       20  67748 Therapeutic Activity (timed):  use of dynamic activities replicating functional movements to increase ROM, strength, coordination, balance, and proprioception in order to improve patient's ability to progress to PLOF and address remaining functional goals.  (see flow sheet as applicable)     Details if applicable:  REASSESS GOALS/FOTO          Details if applicable:            Details if applicable:            Details if applicable:     36  [facility] Totals Reminder: bill using total billable min of TIMED therapeutic procedures (example: do not include dry needle or estim unattended, both untimed codes, in totals to left)  8-22 min = 1 unit; 23-37 min = 2 units; 38-52 min = 3 units; 53-67 min = 4 units; 68-82 min = 5 units